# Patient Record
(demographics unavailable — no encounter records)

---

## 2025-01-15 NOTE — PHYSICAL EXAM
[General Appearance - Alert] : alert [General Appearance - In No Acute Distress] : in no acute distress [Oriented To Time, Place, And Person] : oriented to person, place, and time [Sclera] : the sclera and conjunctiva were normal [Hearing Threshold Finger Rub Not [place]] : hearing was normal [] : no respiratory distress [Motor Tone] : muscle strength and tone were normal

## 2025-01-15 NOTE — ASSESSMENT
[FreeTextEntry1] : IMPRESSION: 75 yo female with PMH of HTN, p/w severe HA to OSH, MRA demonstrated incidental R MCA aneurysm s/p dx angio and R MCA embolization with WEB and ZORAIDA stent 12/18/2020, placed on DAPT with ASA and Brilinta, s/p follow up angio 6/2/21 demonstrating complete occlusion of previously embolized R MCA aneurysm, Last MRA 11/20/23 stable compared to 12/6/22, 11/3/21, 3/4/21.  She presents today after a trauma recently where she fell and hit her head. CT and MRI brain were obtained which were negative for hemorrhage. She reports residual headache and diziness/vertigo. Explained that this could be TBI/concussive symptoms, and it will just take time to recover. No concerning pathology on imaging or exam.    PLAN: continue aspirin 81mg daily  MRA brain non con in 2 years after previous imaging - March 2026 Follow up phone call after to review results

## 2025-01-15 NOTE — HISTORY OF PRESENT ILLNESS
[FreeTextEntry1] : VENESSA RIOJAS is a 74 year old female with PMH of HTN who presented to [facility] 11/30/2020 with severe headaches. Radiographic imaging noted no hemorrhage but MRA brain demonstrated a right middle cerebral artery aneurysm. She was discharged home and advised to follow up outpatient. She underwent a cerebral angiogram and R MCA aneurysm embolization with WEB and ZORAIDA stent on 12/18/2020. She was placed on dual anti platelet therapy with aspirin and brillinta. She underwent a repeat cerebral angiogram on 6/2/2021 demonstrating complete occlusion of the previously embolized right MCA aneurysm. Repeat MRA 11/3/21 and 11/20/23 done at [facility] was unremarkable and stable.  1/15/25: pt arrives for f/u for MRI review from [facility] of12/11/24 after a fall.  Doing well, no deficits. On ASA 81.    4/10/24: Today, patient presents for follow up phone call to review results of MRA brain obtained for complaints of occasional mild right sided head pains. It was done on 3/21/24 at [facility].

## 2025-01-15 NOTE — HISTORY OF PRESENT ILLNESS
[FreeTextEntry1] : VENESSA RIOJAS is a 74 year old female with PMH of HTN who presented to [facility] 11/30/2020 with severe headaches. Radiographic imaging noted no hemorrhage but MRA brain demonstrated a right middle cerebral artery aneurysm. She was discharged home and advised to follow up outpatient. She underwent a cerebral angiogram and R MCA aneurysm embolization with WEB and ZORAIDA stent on 12/18/2020. She was placed on dual anti platelet therapy with aspirin and brillinta. She underwent a repeat cerebral angiogram on 6/2/2021 demonstrating complete occlusion of the previously embolized right MCA aneurysm. Repeat MRA 11/3/21 and 11/20/23 done at [facility] was unremarkable and stable.  1/15/25: pt arrives for f/u for MRI review from [facility] of12/11/24 after a fall.  Doing well, no deficits. On ASA 81.    4/10/24: Today, patient presents for follow up phone call to review results of MRA brain obtained for complaints of occasional mild right sided head pains. It was done on 3/21/24 at [facility].

## 2025-01-15 NOTE — PHYSICAL EXAM
[General Appearance - Alert] : alert [General Appearance - In No Acute Distress] : in no acute distress [Oriented To Time, Place, And Person] : oriented to person, place, and time [Sclera] : the sclera and conjunctiva were normal [Hearing Threshold Finger Rub Not [place]] : hearing was normal [] : no respiratory distress [Motor Tone] : muscle strength and tone were normal

## 2025-04-28 NOTE — ASSESSMENT
[FreeTextEntry1] : 73 y/o F with chronic lower back pain and RLE radiculopathy x years. MRI (4/2024): 12 mm cyst at ventral epidural space extending into the R LR compressing R S1 root; multilevel moderate-severe spinal stenosis at L2-4. Marcie has tried extensive conservative care including PT and HELEN's w/o sustained relief.   - Reviewed surgical options: would plan for a R facet cyst removal. Discussed with Roberts, if sxs persist after resection, then it's likely her sxs are stemming fro the multilevel mod-severe spinal stenosis.  - Marcie will get back to us when she's ready.   The risks and benefits of surgery have been discussed. Risks include but are not limited to bleeding, infection, reaction to anesthesia, injury to blood vessels and nerves, malunion, nonunion, DVT, PE, necessity of repeat surgery, CF leak/repair, chronic pain, loss of limb and death. The patient understands the risks and agrees with the surgical plan. All questions have been answered.

## 2025-04-28 NOTE — HISTORY OF PRESENT ILLNESS
[Lower back] : lower back [de-identified] : 04/28/2025: 74-year F presenting for evaluation of lower back pain that started years ago. Hx of L3-4 decompression surgery >20 years ago. Pain localized right sided radiates into RLE. Tried PT and LESI's in the past w/o any relief. Had L Spine MRI @ 4/2024

## 2025-04-28 NOTE — HISTORY OF PRESENT ILLNESS
[Lower back] : lower back [de-identified] : 04/28/2025: 74-year F presenting for evaluation of lower back pain that started years ago. Hx of L3-4 decompression surgery >20 years ago. Pain localized right sided radiates into RLE. Tried PT and LESI's in the past w/o any relief. Had L Spine MRI @ 4/2024

## 2025-04-28 NOTE — IMAGING
[de-identified] : L Spine Inspection: No defects or deformities Palpation: No tenderness or spasms in b/l lumbar paraspinal musculature ROM:  diminished in all planes Strength: 5/5 b/l hip flexors, knee extensors, ankle dorsiflexors, EHL, ankle plantarflexors Neuro: Sensation LT I + R SLR Toe and heal walk intact Gait mildly antalgic

## 2025-04-28 NOTE — IMAGING
[de-identified] : L Spine Inspection: No defects or deformities Palpation: No tenderness or spasms in b/l lumbar paraspinal musculature ROM:  diminished in all planes Strength: 5/5 b/l hip flexors, knee extensors, ankle dorsiflexors, EHL, ankle plantarflexors Neuro: Sensation LT I + R SLR Toe and heal walk intact Gait mildly antalgic

## 2025-04-28 NOTE — DATA REVIEWED
[MRI] : MRI [Lumbar Spine] : lumbar spine [Report was reviewed and noted in the chart] : The report was reviewed and noted in the chart [I independently reviewed and interpreted images and report] : I independently reviewed and interpreted images and report [FreeTextEntry1] : @ [facility] Spine MRI 4/28/25 1. L5-S1 disc collapse,12 mm cyst within the ventral epidural space to the right of midline extending into the right lateral recess compressing R S1 root. 2. L3-4,4-5 moderate spinal stenosis 3. L2-3 mod-severe spinal stenosis.